# Patient Record
Sex: FEMALE | Race: WHITE | NOT HISPANIC OR LATINO | Employment: OTHER | URBAN - METROPOLITAN AREA
[De-identification: names, ages, dates, MRNs, and addresses within clinical notes are randomized per-mention and may not be internally consistent; named-entity substitution may affect disease eponyms.]

---

## 2017-10-10 ENCOUNTER — TELEPHONE (OUTPATIENT)
Dept: PAIN MEDICINE | Facility: CLINIC | Age: 57
End: 2017-10-10

## 2017-10-10 NOTE — TELEPHONE ENCOUNTER
----- Message from Penny Martinez sent at 10/10/2017 12:40 PM CDT -----  Contact: Patient  Patient called and stated that Dr. Dixon sent over a referral for the patient to see Dr. Morillo on 10/09/17 and she is checking the status so that she can get scheduled. She can be contacted at [phone].    Thanks,  Penny

## 2017-10-12 ENCOUNTER — TELEPHONE (OUTPATIENT)
Dept: PAIN MEDICINE | Facility: CLINIC | Age: 57
End: 2017-10-12

## 2017-10-12 NOTE — TELEPHONE ENCOUNTER
----- Message from Tayla Trinidad sent at 10/12/2017 11:50 AM CDT -----  Contact: self   Patient would like to consult with nurse regarding referral . Please call back at [phone].      Thanks,  Tayla Trinidad